# Patient Record
Sex: MALE | Race: WHITE | NOT HISPANIC OR LATINO | URBAN - METROPOLITAN AREA
[De-identification: names, ages, dates, MRNs, and addresses within clinical notes are randomized per-mention and may not be internally consistent; named-entity substitution may affect disease eponyms.]

---

## 2020-10-15 ENCOUNTER — EMERGENCY (EMERGENCY)
Facility: HOSPITAL | Age: 55
LOS: 1 days | Discharge: HOME | End: 2020-10-15
Attending: EMERGENCY MEDICINE | Admitting: EMERGENCY MEDICINE
Payer: OTHER MISCELLANEOUS

## 2020-10-15 VITALS
SYSTOLIC BLOOD PRESSURE: 147 MMHG | HEART RATE: 77 BPM | DIASTOLIC BLOOD PRESSURE: 84 MMHG | RESPIRATION RATE: 16 BRPM | OXYGEN SATURATION: 100 % | TEMPERATURE: 97 F

## 2020-10-15 PROCEDURE — 99283 EMERGENCY DEPT VISIT LOW MDM: CPT

## 2020-10-15 NOTE — ED PROVIDER NOTE - CLINICAL SUMMARY MEDICAL DECISION MAKING FREE TEXT BOX
54 y/o male [employer] employee with left hand 1st digit avulsion with no deep lac or foreign body and no FND. UTD tetanus. D/c home with return precautions

## 2020-10-15 NOTE — ED PROVIDER NOTE - PATIENT PORTAL LINK FT
You can access the FollowMyHealth Patient Portal offered by [facility] by registering at the following website: [email or web address]. By joining [facility]’s FollowMyHealth portal, you will also be able to view your health information using other applications (apps) compatible with our system.

## 2020-10-15 NOTE — ED PROVIDER NOTE - OBJECTIVE STATEMENT
54 y/o male [employer] employee with left hand finger injury. Patient cut his hand with a razor blade while working with associated bleeding. Patient denies any numbness/tingling or reduced ROM. Patient up to date with Tetanus. Patient denies fevers/chills, n/v/d, cough, or changes in urination.

## 2020-10-15 NOTE — ED PROVIDER NOTE - ATTENDING CONTRIBUTION TO CARE
Dr. Pearson: 56 yo male with no sig PM, [employer] employee, in ED with avulsion injury to left first digit pad sustained with razor blade while working.  No other injuries.  Pt cleaned wound immediately and came to ED.  No weakness or numbness/tingling.  Last tetanus vaccine less than 1 year ago.  On exam left thumb distal pad with avulsion injury oozing blood but not significantly.  No nailbed injury.

## 2020-10-15 NOTE — ED PROVIDER NOTE - PHYSICAL EXAMINATION
Gen: WDWN, NAD  HEENT: EOMI, no nasal discharge, mucous membranes moist  CV: RRR, +S1/S2, no M/R/G  Resp: CTAB, no W/R/R  GI: Abdomen soft non-distended, NTTP, no masses  MSK: Left 1st digit superficial avulsion on palmar surface distal to DIP with no nail bed involvement and with no deep laceration or foreign body visualized  Neuro: A&Ox4, following commands, moving all four extremities spontaneously. Strength 5/5 with no changes in sensation in affected extremity.

## 2020-10-15 NOTE — ED ADULT TRIAGE NOTE - CHIEF COMPLAINT QUOTE
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 States he is UTD with tetanus.